# Patient Record
Sex: MALE | Race: WHITE | NOT HISPANIC OR LATINO | URBAN - METROPOLITAN AREA
[De-identification: names, ages, dates, MRNs, and addresses within clinical notes are randomized per-mention and may not be internally consistent; named-entity substitution may affect disease eponyms.]

---

## 2017-02-23 ENCOUNTER — EMERGENCY (EMERGENCY)
Facility: HOSPITAL | Age: 52
LOS: 1 days | Discharge: HOME | End: 2017-02-23
Attending: EMERGENCY MEDICINE | Admitting: EMERGENCY MEDICINE
Payer: MEDICAID

## 2017-02-23 VITALS
OXYGEN SATURATION: 95 % | RESPIRATION RATE: 16 BRPM | SYSTOLIC BLOOD PRESSURE: 114 MMHG | HEART RATE: 70 BPM | DIASTOLIC BLOOD PRESSURE: 75 MMHG | TEMPERATURE: 98 F

## 2017-02-23 VITALS
DIASTOLIC BLOOD PRESSURE: 77 MMHG | SYSTOLIC BLOOD PRESSURE: 117 MMHG | TEMPERATURE: 98 F | OXYGEN SATURATION: 96 % | HEART RATE: 69 BPM | RESPIRATION RATE: 16 BRPM

## 2017-02-23 DIAGNOSIS — F10.129 ALCOHOL ABUSE WITH INTOXICATION, UNSPECIFIED: ICD-10-CM

## 2017-02-23 DIAGNOSIS — R41.82 ALTERED MENTAL STATUS, UNSPECIFIED: ICD-10-CM

## 2017-02-23 PROCEDURE — 99283 EMERGENCY DEPT VISIT LOW MDM: CPT

## 2017-02-23 NOTE — ED PROVIDER NOTE - OBJECTIVE STATEMENT
50 yo M w/ unknown PMHx sent in from [facility] for AMS. Pt brought in for suspected drug use. Pt minimally cooperative with hx and ROS. No acute medical complaints or apparent trauma noted. No bleeding, respiratory distress, pain, vomiting, or urinary/bowel incontinence noted.

## 2017-02-23 NOTE — ED PROVIDER NOTE - ATTENDING CONTRIBUTION TO CARE
Suspected drug use, no signs of trauma.  Stable ABCs, atraumatic on exam.  Plan serial exams and reassess for clinical sobriety.

## 2017-02-23 NOTE — ED PROVIDER NOTE - MEDICAL DECISION MAKING DETAILS
Pt BIBEMS for suspected drug use. Will observe until clinical sobriety.  PA STATEMENT: I personally performed the services described in the documentation, reviewed the documentation recorded by the scribe in my presence and it accurately and completely records my words and action. Pt BIBEMS for suspected drug use. Will observe until clinical sobriety.  PA STATEMENT: I personally performed the services described in the documentation, reviewed the documentation recorded by the scribe in my presence and it accurately and completely records my words and action.  (HONG Guerrero) Pt reassessed and is A&Ox3. NAD. Calm and cooperative. Clear speech, steady gait with no complaints at this time. Will D/C.

## 2017-02-23 NOTE — ED ADULT TRIAGE NOTE - CHIEF COMPLAINT QUOTE
Per EMS, patient sent from [facility] for drug abuse. Pupils pin point on arrival. Responds to verbal.

## 2017-02-23 NOTE — ED PROVIDER NOTE - NS ED MD SCRIBE ATTENDING SCRIBE SECTIONS
PAST MEDICAL/SURGICAL/SOCIAL HISTORY/PHYSICAL EXAM/DISPOSITION/REVIEW OF SYSTEMS/PROGRESS NOTE/CONSULTATIONS/SHIFT CHANGE/HIV/RESULTS/HISTORY OF PRESENT ILLNESS/INTAKE ASSESSMENT/SCREENINGS/VITAL SIGNS( Pullset)

## 2017-02-23 NOTE — ED ADULT NURSE NOTE - CHIEF COMPLAINT QUOTE
Per EMS, patient sent from [facility] for drug abuse. Pupils pin point on arrival. Responds to verbal.

## 2017-04-01 ENCOUNTER — EMERGENCY (EMERGENCY)
Facility: HOSPITAL | Age: 52
LOS: 1 days | Discharge: HOME | End: 2017-04-01
Attending: EMERGENCY MEDICINE | Admitting: EMERGENCY MEDICINE
Payer: MEDICAID

## 2017-04-01 VITALS
OXYGEN SATURATION: 94 % | RESPIRATION RATE: 16 BRPM | HEART RATE: 85 BPM | TEMPERATURE: 98 F | SYSTOLIC BLOOD PRESSURE: 121 MMHG | DIASTOLIC BLOOD PRESSURE: 81 MMHG

## 2017-04-01 DIAGNOSIS — F11.20 OPIOID DEPENDENCE, UNCOMPLICATED: ICD-10-CM

## 2017-04-01 DIAGNOSIS — F17.200 NICOTINE DEPENDENCE, UNSPECIFIED, UNCOMPLICATED: ICD-10-CM

## 2017-04-01 PROCEDURE — 99283 EMERGENCY DEPT VISIT LOW MDM: CPT

## 2017-04-01 NOTE — ED ADULT NURSE NOTE - CHIEF COMPLAINT QUOTE
Sent from [facility], states found patient to have "NEEDLE", possible heroine use Patient is sleepy in triage, but is responsive to all stimuli. able to ambulate

## 2017-04-01 NOTE — ED PROVIDER NOTE - OBJECTIVE STATEMENT
50 yo M with PMHx of substance abuse, sent from [facility] for medical clearance.  Pt was found with a needle at the shelter, suspicious for heroin use.  Pt denies illicit drug use and reports no active sx besides feeling hungry.  Denies trauma, fall, HA, dizziness, bleeding, N/V/D/C, CP, SOB, palpitations, tremors, change in urinary/bowel function, and abdominal pain. 50 yo M with PMHx of substance abuse, sent from [facility] for medical clearance.  Pt was found with a needle at the shelter, suspicious for heroin use.  Pt admits to snorting one bag of heroin this morning but no active sx besides feeling hungry currently.  Denies trauma, fall, HA, dizziness, bleeding, N/V/D/C, CP, SOB, palpitations, tremors, change in urinary/bowel function, and abdominal pain.

## 2017-04-01 NOTE — ED ADULT TRIAGE NOTE - CHIEF COMPLAINT QUOTE
Sent from [facility], states found patient to have "NEEDLE", possible heroine use Patient is sleepy in triage, but is responsive to all stimuli. able to ambulate

## 2017-04-01 NOTE — ED PROVIDER NOTE - MEDICAL DECISION MAKING DETAILS
pt here for heroin use, monitored in the ED with improved mental status, AFVSS, currently ambulatory with steady gait, tolerating PO without N/V, clear speech, medically stable for d/c.

## 2017-04-01 NOTE — ED PROVIDER NOTE - NEUROLOGICAL, MLM
somnolent but arousable and responsive to verbal stimuli, no focal neuro deficits, ambulatory without gait disturbance

## 2017-04-01 NOTE — ED ADULT NURSE NOTE - OBJECTIVE STATEMENT
Pt BIBA for suspected heroin use at shelter. Pt sleepy but wakes up to verbal command, in NAD. Can ambulate, asking for food.

## 2017-09-10 NOTE — ED PROVIDER NOTE - NSCAREINITIATED _GEN_ER
Haider Moore(PA) Patient is a 23 yo M with history of panic attacks here for evaluation of palpiations and panic attacks. Per patient, he has been seeing Dr. Temple of psychiatry and has been prescribed various benzodiazepines for his panic disorder with variable success. In the past, he has been on xanax which was changed to ativan because it made him too sleepy. He reports ativan caused him to have palpitations and then klonopin was tried which he thinks made him depressed. Most recently, his doctor prescribed him valium. Patient reports taking 6 mg of valium and then having a panic attack. He is c/o chest tightness and recent cold. No f/n/v. Patient reports these attacks are similar to his prior attacks.
